# Patient Record
Sex: FEMALE | Race: BLACK OR AFRICAN AMERICAN | NOT HISPANIC OR LATINO | Employment: PART TIME | URBAN - METROPOLITAN AREA
[De-identification: names, ages, dates, MRNs, and addresses within clinical notes are randomized per-mention and may not be internally consistent; named-entity substitution may affect disease eponyms.]

---

## 2018-11-26 ENCOUNTER — HOME CARE/HOSPICE - HEALTHEAST (OUTPATIENT)
Dept: HOME HEALTH SERVICES | Facility: HOME HEALTH | Age: 23
End: 2018-11-26

## 2018-11-26 ENCOUNTER — ANESTHESIA - HEALTHEAST (OUTPATIENT)
Dept: OBGYN | Facility: HOSPITAL | Age: 23
End: 2018-11-26

## 2018-12-02 ENCOUNTER — COMMUNICATION - HEALTHEAST (OUTPATIENT)
Dept: SCHEDULING | Facility: CLINIC | Age: 23
End: 2018-12-02

## 2018-12-04 ENCOUNTER — COMMUNICATION - HEALTHEAST (OUTPATIENT)
Dept: OBGYN | Facility: HOSPITAL | Age: 23
End: 2018-12-04

## 2019-12-27 ENCOUNTER — RECORDS - HEALTHEAST (OUTPATIENT)
Dept: LAB | Facility: HOSPITAL | Age: 24
End: 2019-12-27

## 2019-12-27 LAB — HBV SURFACE AB SERPL IA-ACNC: NEGATIVE M[IU]/ML

## 2019-12-30 LAB
GAMMA INTERFERON BACKGROUND BLD IA-ACNC: 0.02 IU/ML
M TB IFN-G BLD-IMP: NEGATIVE
MITOGEN IGNF BCKGRD COR BLD-ACNC: -0.01 IU/ML
MITOGEN IGNF BCKGRD COR BLD-ACNC: 0 IU/ML
QTF INTERPRETATION: NORMAL
QTF MITOGEN - NIL: 9.42 IU/ML

## 2021-07-14 PROBLEM — Z34.90 PREGNANT: Status: RESOLVED | Noted: 2018-11-25 | Resolved: 2018-11-26

## 2021-09-24 ENCOUNTER — DOCUMENTATION ONLY (OUTPATIENT)
Dept: TRANSPLANT | Facility: CLINIC | Age: 26
End: 2021-09-24

## 2021-09-24 ENCOUNTER — TELEPHONE (OUTPATIENT)
Dept: TRANSPLANT | Facility: CLINIC | Age: 26
End: 2021-09-24

## 2021-09-24 NOTE — TELEPHONE ENCOUNTER
"Donor Intake Start:21Donor Intake Complete:21  Expiration Date:21  Gender:FemalePreferred Language:English  Full Name:Amber DOWNING TessaMadelaine  Needed:[not answered]  Phone Number:[phone] Phone:  Contact Preference:[not answered]Best Contact Time:Noon - 5pm  Emergency Contact:Fidelia Rosales Contact #:[phone]  Relationship to Contact:Other  :95Age:26  Country:[facility]  Address:[address] [place] [address] 1City:[place]  State:[id number]  Height:5'5\"Weight:210lbs  BMI:34.9  Employment Status:UnemployedHas PTO for donation?[not answered]  Occupation:[not answered]Requires Heavy Lifting?[not answered]  Education Level:High SchoolMarital Status:Single  Exercise Routine:OccasionalHealth Insurance:  Yes  Blood Type:UnknownEthnicity/Race:Black or African American  Donor Type:Standard Voucher Donor  Prefer Remote Donation:[not answered]  Physician:Kathryn grande [place], [place]  Donating for Recipient Transfer  Recipient's Center:[unknown]   Recipient's Name:Moiz Mauro :66  Recipient's Status:Patient not on dialysis but needs a transplant soon.How DD knows Recip:Child Of Patient  DD communicates w/ Recip:Every Day  Indicated possible interest in:  Motivation to donate:  My mother needs a kidney  Living Donor Pre-Screening  Is In [place]  Yes  Will Accept Blood Transfusions?  Yes  Has been Diagnosed with Kidney Disease?  No  Has had a Heart Attack?  No  Has Diabetes?  No  Has had Cancer?  No  Has had Kidney Stones?  No  Has ever been Pregnant?  Yes    - Is Currently Pregnant?  No    - Months Since Pregnancy?24+    - Is Currently Nursing?  No    - Gestational Diabetes?  No    - Hypertension during pregnancy?  Never  Is Planning on Pregnancy?  No  Is Taking Birth Control?  No  Has Used Tobacco  Yes    - Currently uses Tobacco?  Yes    - Will Stop for Surgery?  Yes    - How Many Years:10    - Tobacco use (packs/cans) / Frequency:1 / Daily  Has " HIV?  No  Is Currently Incarcerated?  No  Is Currently Residing in [place]  Yes  History Misc  Has Allergies?  No  Has had Surgeries?  No  Takes Medication?  No  Medical History  History of High BP?  Never  Has History Of CABG (bypass surgery)?  No  History of Blood Clots?  Never  History of Coronary Disease?  Never  Has Stents Implanted?  No  Has History of Chest Pain with Exercise?  No  Has History of Chest Pain at Other Times?  No  Results of Climbing 2 Flights of Stairs?No Problem  Has had Stress Test within Last Year?  No  Has had Stroke?  No  Has had Leg Bypass?  No  History of Lung Disease?  Never  History of COPD?  Never  History of TB?  Never    - Is TB Active?[not answered]  History of Pneumonia?  Never  Has Respiratory Issues?  Yes    - Respiratory Issues:Asthma  Has Gastro Issues?  No  History of Gallstones?  Treated in past  History of Pancreatitis?  Never  History of Liver Disease?  Never  History of Hepatitis B?  Never    - Is Hep B Active?[not answered]  History of Hepatitis C?  Never  History of Bleeding Problem?  Never  History of UTIs?  Yes    - UTI episodes:1    - Last UTI:6 years  History of Kidney Damage?  Never  History of Proteinuria?  Never  History of Hematuria?  Never  History of Neuro Disease?  Never  History of Seizure?  Never  History of Lupus?  Never  History of Paralysis?  Never  History of Arthritis?  Never  History of Neuropathy?  Never  History of Depression?  Never  History of Anxiety?  Never  History of Documented Psychiatric Illness?  Never  History of Fibroid Uterus?  Never  History of Endometriosis?  Never  History of Polycystic Ovaries?  Never  Has had Miscarriages?  No  Has had Abortions?  Yes    - # of Abortions:2  Has had Transfusions?  No  History of Obesity?  No  History of Fabry's Disease?  No  History of Sickle Cell Disease?  No  History of Sickle Cell Trait?  No  History of Sarcoidosis?  No  History of Auto-Immune Disease  No  Has had Physical Exam?  Yes    - how many  years ago:6  Has had Mammogram?  No    - how many years ago:  Has had Pap Smear?  Yes    - how many years ago:2  Has had Colonoscopy?  No  Medical History Comments?[no comments]  Living Donor Family Medical History  Anyone with kidney disease?  No  Anyone with liver disease?  No  Anyone with heart disease?  No  Anyone with coronary artery disease?  No  Anyone with high blood pressure?  Yes    - which family members:Both sides  Anyone with blood disorder?  No  Anyone with cancer?  Yes    - which family members:Dads side  Anyone with kidney cancer?  No  Anyone with diabetes?  Yes    - which family members:Both sides  Is mother alive?  Yes  Mother's age?54  Is father alive?  Yes  Father's age?57  How many siblings?10  How many adult children?0  How many children under 18?2  Social History  Has Used Alcohol?  Yes    - currently uses alcohol:  Yes    - how much:1/Weekly  Has Abused Alcohol?  No  Has Used Drugs?  No  Has had legal issues w/ law enforcement?  No  Traveled over 100 miles from home in last year?  Yes    - Traveled Where?[place] and [place]  Has had suicidal thoughts or attempts in the last five years?  No

## 2021-09-28 ENCOUNTER — TELEPHONE (OUTPATIENT)
Dept: TRANSPLANT | Facility: CLINIC | Age: 26
End: 2021-09-28

## 2021-09-28 NOTE — TELEPHONE ENCOUNTER
I left a message for Amber asking her to reschedule her initial WILFREDO call.  I gave her the main transplant center phone number, [phone]-, option 3.

## 2021-10-14 ENCOUNTER — TELEPHONE (OUTPATIENT)
Dept: TRANSPLANT | Facility: CLINIC | Age: 26
End: 2021-10-14

## 2021-10-14 NOTE — TELEPHONE ENCOUNTER
Patient states we called her mother's number attempting to reach her. Writer fixed patient's demographics and is expecting a call from Alexa when she returns.

## 2021-10-19 ENCOUNTER — TELEPHONE (OUTPATIENT)
Dept: TRANSPLANT | Facility: CLINIC | Age: 26
End: 2021-10-19

## 2021-10-19 NOTE — TELEPHONE ENCOUNTER
Initial Independent Living Donor Advocate contact made with potential donor today.  I introduced myself and my role during the donation process, includin.  WILFREDO ROLE   The federal government requires that all licensed transplant centers provide the living donor with an Independent Living Donor Advocate (WILFREDO).  I do not meet recipients or attend meetings that discuss their care or decision to transplant them. My role is separate to avoid any conflict of interest.  My role is to ensure:  1) your rights are protected;  2) you get all the information you need from the transplant team to make a fully informed decision whether to donate;   3) that living donation is in your best interest.   4) that you have the right to decide NOT to go forward with living donation at any time during this process.  I am available to you throughout the workup, during surgery phase and follow-up at home.   2. WORKUP & PRIVACY     Your identity and workup are not shared with the recipient at any time.     There is a medical donor workup that consists of testing to determine if you are healthy enough to donate.  Workup tests include many blood draws, urine collection/ (kidney function testing), chest x-ray, EKG, CT scan. As you complete each step then you may move on to the next.  Workup can take as little or as long as you need and you can stop the process at any time.     Transplant is a treatment option, not a cure. A kidney from a living kidney donor can last 12-14 years.  Other treatment options are  donation and two types of dialysis.     This is major surgery and your estimated hospital stay is approximately 1-2 nights.  After surgery, there are driving and lifting restrictions - no driving for two weeks and no lifting over ten pounds for 6 - 8 weeks.  Donors are routinely off from work for 4 - 6 weeks after surgery, and potentially longer if they have a physical job.       If you anticipate lost wages due to donation,  donor wage reimbursement options may be available to you and will be reviewed with you during the evaluation process.      The recipient's insurance covers the medical expenses related to the donor evaluation and surgery.  However, it is important for you to carry your own health insurance to address any medical issues that are found and are NOT related to living donation.  3.  QUESTIONS  Have you received a packet from the transplant department?     Questions?    Have you discussed with anyone your potential decision to donate?   No.  Is anyone pressuring or coercing you to donate? No.  Have you discussed any financial arrangements with recipient around donating a kidney? No.  Are you aware that you can confidentially opt out at any time, up to and including day of donation? Yes.  At this time, would you like to proceed with the medical evaluation to see if you can be a kidney donor?  Yes.    If yes, the donor coordinator will be reaching out to you with next steps.     You can reach me or someone else on the WILFREDO team by calling [phone] Option 3.    WILFREDO NOTES: Highly motivated to continue evaluation process.  Appt with Alexa Wetzel scheduled for 10/25 at 1PM    Duration of call 20 minutes

## 2021-10-25 ENCOUNTER — DOCUMENTATION ONLY (OUTPATIENT)
Dept: TRANSPLANT | Facility: CLINIC | Age: 26
End: 2021-10-25

## 2021-10-25 NOTE — PROGRESS NOTES
Sent an email to Amber informing her that currently we are holding off on donors for her recip.  I instructed her that she will be a backup and we will bring forward if need be.

## 2022-11-14 PROCEDURE — U0003 INFECTIOUS AGENT DETECTION BY NUCLEIC ACID (DNA OR RNA); SEVERE ACUTE RESPIRATORY SYNDROME CORONAVIRUS 2 (SARS-COV-2) (CORONAVIRUS DISEASE [COVID-19]), AMPLIFIED PROBE TECHNIQUE, MAKING USE OF HIGH THROUGHPUT TECHNOLOGIES AS DESCRIBED BY CMS-2020-01-R: HCPCS | Mod: ORL | Performed by: FAMILY MEDICINE

## 2022-11-15 ENCOUNTER — LAB REQUISITION (OUTPATIENT)
Dept: LAB | Facility: CLINIC | Age: 27
End: 2022-11-15
Payer: COMMERCIAL

## 2022-11-15 DIAGNOSIS — Z20.822 CONTACT WITH AND (SUSPECTED) EXPOSURE TO COVID-19: ICD-10-CM

## 2022-11-15 LAB — SARS-COV-2 RNA RESP QL NAA+PROBE: NEGATIVE

## 2022-11-21 ENCOUNTER — LAB REQUISITION (OUTPATIENT)
Dept: LAB | Facility: CLINIC | Age: 27
End: 2022-11-21

## 2022-11-21 DIAGNOSIS — Z11.3 ENCOUNTER FOR SCREENING FOR INFECTIONS WITH A PREDOMINANTLY SEXUAL MODE OF TRANSMISSION: ICD-10-CM

## 2022-11-21 PROCEDURE — 86803 HEPATITIS C AB TEST: CPT | Performed by: OBSTETRICS & GYNECOLOGY

## 2022-11-21 PROCEDURE — 87389 HIV-1 AG W/HIV-1&-2 AB AG IA: CPT | Performed by: OBSTETRICS & GYNECOLOGY

## 2022-11-21 PROCEDURE — 86780 TREPONEMA PALLIDUM: CPT | Performed by: OBSTETRICS & GYNECOLOGY

## 2022-11-21 PROCEDURE — 87340 HEPATITIS B SURFACE AG IA: CPT | Performed by: OBSTETRICS & GYNECOLOGY

## 2022-11-22 LAB
HBV SURFACE AG SERPL QL IA: NONREACTIVE
HCV AB SERPL QL IA: NONREACTIVE
HIV 1+2 AB+HIV1 P24 AG SERPL QL IA: NONREACTIVE
T PALLIDUM AB SER QL: NONREACTIVE

## 2023-03-06 ENCOUNTER — LAB REQUISITION (OUTPATIENT)
Dept: LAB | Facility: CLINIC | Age: 28
End: 2023-03-06

## 2023-03-06 DIAGNOSIS — R39.9 UNSPECIFIED SYMPTOMS AND SIGNS INVOLVING THE GENITOURINARY SYSTEM: ICD-10-CM

## 2023-03-06 PROCEDURE — 87086 URINE CULTURE/COLONY COUNT: CPT | Performed by: NURSE PRACTITIONER

## 2023-03-08 LAB — BACTERIA UR CULT: NORMAL

## 2024-08-27 ENCOUNTER — HOSPITAL ENCOUNTER (EMERGENCY)
Facility: CLINIC | Age: 29
Discharge: LEFT BEFORE DISCHARGE | End: 2024-08-27
Attending: EMERGENCY MEDICINE | Admitting: EMERGENCY MEDICINE
Payer: COMMERCIAL

## 2024-08-27 VITALS
OXYGEN SATURATION: 99 % | SYSTOLIC BLOOD PRESSURE: 147 MMHG | HEART RATE: 64 BPM | TEMPERATURE: 98.2 F | BODY MASS INDEX: 36.65 KG/M2 | HEIGHT: 65 IN | WEIGHT: 220 LBS | DIASTOLIC BLOOD PRESSURE: 70 MMHG | RESPIRATION RATE: 18 BRPM

## 2024-08-27 DIAGNOSIS — M25.552 HIP PAIN, LEFT: ICD-10-CM

## 2024-08-27 LAB
ANION GAP SERPL CALCULATED.3IONS-SCNC: 12 MMOL/L (ref 7–15)
BASOPHILS # BLD AUTO: 0.1 10E3/UL (ref 0–0.2)
BASOPHILS NFR BLD AUTO: 1 %
BUN SERPL-MCNC: 6.3 MG/DL (ref 6–20)
CALCIUM SERPL-MCNC: 9 MG/DL (ref 8.8–10.4)
CHLORIDE SERPL-SCNC: 107 MMOL/L (ref 98–107)
CREAT SERPL-MCNC: 0.82 MG/DL (ref 0.51–0.95)
CRP SERPL-MCNC: 3.76 MG/L
EGFRCR SERPLBLD CKD-EPI 2021: >90 ML/MIN/1.73M2
EOSINOPHIL # BLD AUTO: 0.1 10E3/UL (ref 0–0.7)
EOSINOPHIL NFR BLD AUTO: 1 %
ERYTHROCYTE [DISTWIDTH] IN BLOOD BY AUTOMATED COUNT: 13.5 % (ref 10–15)
ERYTHROCYTE [SEDIMENTATION RATE] IN BLOOD BY WESTERGREN METHOD: 14 MM/HR (ref 0–20)
GLUCOSE SERPL-MCNC: 113 MG/DL (ref 70–99)
HCG SERPL QL: NEGATIVE
HCO3 SERPL-SCNC: 23 MMOL/L (ref 22–29)
HCT VFR BLD AUTO: 37.1 % (ref 35–47)
HGB BLD-MCNC: 12.2 G/DL (ref 11.7–15.7)
HOLD SPECIMEN: NORMAL
IMM GRANULOCYTES # BLD: 0 10E3/UL
IMM GRANULOCYTES NFR BLD: 0 %
LYMPHOCYTES # BLD AUTO: 3 10E3/UL (ref 0.8–5.3)
LYMPHOCYTES NFR BLD AUTO: 33 %
MCH RBC QN AUTO: 26.4 PG (ref 26.5–33)
MCHC RBC AUTO-ENTMCNC: 32.9 G/DL (ref 31.5–36.5)
MCV RBC AUTO: 80 FL (ref 78–100)
MONOCYTES # BLD AUTO: 0.4 10E3/UL (ref 0–1.3)
MONOCYTES NFR BLD AUTO: 5 %
NEUTROPHILS # BLD AUTO: 5.4 10E3/UL (ref 1.6–8.3)
NEUTROPHILS NFR BLD AUTO: 60 %
NRBC # BLD AUTO: 0 10E3/UL
NRBC BLD AUTO-RTO: 0 /100
PLATELET # BLD AUTO: 326 10E3/UL (ref 150–450)
POTASSIUM SERPL-SCNC: 3.9 MMOL/L (ref 3.4–5.3)
RBC # BLD AUTO: 4.62 10E6/UL (ref 3.8–5.2)
SODIUM SERPL-SCNC: 142 MMOL/L (ref 135–145)
WBC # BLD AUTO: 9 10E3/UL (ref 4–11)

## 2024-08-27 PROCEDURE — 36415 COLL VENOUS BLD VENIPUNCTURE: CPT | Performed by: EMERGENCY MEDICINE

## 2024-08-27 PROCEDURE — 250N000013 HC RX MED GY IP 250 OP 250 PS 637: Performed by: EMERGENCY MEDICINE

## 2024-08-27 PROCEDURE — 99283 EMERGENCY DEPT VISIT LOW MDM: CPT

## 2024-08-27 PROCEDURE — 80048 BASIC METABOLIC PNL TOTAL CA: CPT | Performed by: EMERGENCY MEDICINE

## 2024-08-27 PROCEDURE — 85025 COMPLETE CBC W/AUTO DIFF WBC: CPT | Performed by: EMERGENCY MEDICINE

## 2024-08-27 PROCEDURE — 86140 C-REACTIVE PROTEIN: CPT | Performed by: EMERGENCY MEDICINE

## 2024-08-27 PROCEDURE — 84703 CHORIONIC GONADOTROPIN ASSAY: CPT | Performed by: EMERGENCY MEDICINE

## 2024-08-27 PROCEDURE — 85652 RBC SED RATE AUTOMATED: CPT | Performed by: EMERGENCY MEDICINE

## 2024-08-27 RX ORDER — IBUPROFEN 600 MG/1
600 TABLET, FILM COATED ORAL ONCE
Status: COMPLETED | OUTPATIENT
Start: 2024-08-27 | End: 2024-08-27

## 2024-08-27 RX ORDER — ACETAMINOPHEN 325 MG/1
650 TABLET ORAL ONCE
Status: COMPLETED | OUTPATIENT
Start: 2024-08-27 | End: 2024-08-27

## 2024-08-27 RX ADMIN — ACETAMINOPHEN 650 MG: 325 TABLET ORAL at 11:32

## 2024-08-27 RX ADMIN — IBUPROFEN 600 MG: 600 TABLET ORAL at 11:32

## 2024-08-27 ASSESSMENT — ACTIVITIES OF DAILY LIVING (ADL): ADLS_ACUITY_SCORE: 35

## 2024-08-27 ASSESSMENT — COLUMBIA-SUICIDE SEVERITY RATING SCALE - C-SSRS
6. HAVE YOU EVER DONE ANYTHING, STARTED TO DO ANYTHING, OR PREPARED TO DO ANYTHING TO END YOUR LIFE?: NO
2. HAVE YOU ACTUALLY HAD ANY THOUGHTS OF KILLING YOURSELF IN THE PAST MONTH?: NO
1. IN THE PAST MONTH, HAVE YOU WISHED YOU WERE DEAD OR WISHED YOU COULD GO TO SLEEP AND NOT WAKE UP?: NO

## 2024-08-27 NOTE — ED TRIAGE NOTES
Pt presents with non radiating left hip pain x 2 days. Denies any numbnes or tingling. Sent in from ortho clinic due to concern for infection. Has screw in hip from previous surgery.      Triage Assessment (Adult)       Row Name 08/27/24 1053          Triage Assessment    Airway WDL WDL        Respiratory WDL    Respiratory WDL WDL        Skin Circulation/Temperature WDL    Skin Circulation/Temperature WDL WDL        Cardiac WDL    Cardiac WDL WDL        Peripheral/Neurovascular WDL    Peripheral Neurovascular WDL WDL        Cognitive/Neuro/Behavioral WDL    Cognitive/Neuro/Behavioral WDL WDL

## 2024-08-27 NOTE — ED PROVIDER NOTES
EMERGENCY DEPARTMENT ENCOUNTER      NAME: Amber Sharpe  AGE: 28 year old female  YOB: 1995  MRN: [id number]  EVALUATION DATE & TIME: No admission date for patient encounter.    PCP: Nicolás Rodriguez    ED PROVIDER: Janusz Schofield M.D.      Chief Complaint   Patient presents with    Hip Pain         FINAL IMPRESSION:  1. Hip pain, left          ED COURSE & MEDICAL DECISION MAKIN year old female presents to the Emergency Department for evaluation of left hip pain.  Patient has a history of a remote hip surgery more than 15 years ago as an adolescent.  She presents with left hip pain without any trauma today.  Was referred first from  telemedicine and then orthopedics clinic due to concern about atraumatic left hip pain with fevers.  She does have slightly decreased range of motion of the left hip and is ambulatory but with a limp.  She underwent lab evaluations which revealed a normal white blood cell count and inflammatory markers.  After speaking with the orthopedic PA from urgent care, I had ordered a MRI of the hip with and without contrast to evaluate for any signs of effusion  or signs of infection, with plan to potentially involve IR for an aspiration if there was a significant effusion.  I was informed a little while after I met the patient but the patient had decided to leave [place] prior to completion of her workup.  I was not given the opportunity to talk to the patient prior to her departing to review any concerns or questions.  I was unable to reach her at the listed phone number to discuss her plans.      Medical Decision Making  Obtained supplemental history:Supplemental history obtained?: No  Reviewed external records: External records reviewed?: No  Care impacted by chronic illness:Mental Health  Care significantly affected by social determinants of health:Access to Medical Care  Did you consider but not order tests?: Work up considered but not performed and documented  in chart, if applicable  Did you interpret images independently?: Independent interpretation of ECG and images noted in documentation, when applicable.  Consultation discussion with other provider:Did you involve another provider (consultant, , pharmacy, etc.)?: I discussed the care with another health care provider, see documentation for details.  AMA  No MIPS measures identified.          MEDICATIONS GIVEN IN THE EMERGENCY:  Medications   ibuprofen (ADVIL/MOTRIN) tablet 600 mg (600 mg Oral $Given 8/27/24 1132)   acetaminophen (TYLENOL) tablet 650 mg (650 mg Oral $Given 8/27/24 1132)       NEW PRESCRIPTIONS STARTED AT TODAY'S ER VISIT  Discharge Medication List as of 8/27/2024  2:05 PM             =================================================================    HPI    Patient information was obtained from: the patient    Use of : N/A      Amber Sharpe is a 28 year old female with a pertinent history of depression, hip surgery, and osteoarthritis of both hips who presents to this ED via ambulation for evaluation of hip pain.  Patient reports left hip pain which started a couple of days ago.  She had a hip surgery a number of years ago while she was in eighth grade.  She has not had issues since.  She denies any specific trauma.  She reports for the last couple days she has felt hot and cold, potentially feverish.  Has not measured a temperature at home.  She was seen by telemedicine provider that referred her to orthopedic urgent care today.  There they obtained x-rays which showed some hardware in her left hip but referred her here for consideration of further infectious workup.    REVIEW OF SYSTEMS   All systems reviewed and negative except as noted in HPI.    PAST MEDICAL HISTORY:  History reviewed. No pertinent past medical history.    PAST SURGICAL HISTORY:  Past Surgical History:   Procedure Laterality Date    HIP SURGERY             CURRENT MEDICATIONS:    No current  facility-administered medications for this encounter.     Current Outpatient Medications   Medication Sig Dispense Refill    ALBUTEROL IN Inhale  into the lungs.      Fluticasone Propionate, Inhal, (FLOVENT IN) Inhale  into the lungs.      PREDNISONE PO Take  by mouth.           ALLERGIES:  Allergies   Allergen Reactions    Fish Allergy Anaphylaxis     Fish sticks   Lips swelling    Hydrocodone-Acetaminophen Itching, Nausea, Rash and Shortness Of Breath    Banana Swelling     Lips swelling    Cantaloupe Extract Allergy Skin Test Swelling     Lips swelling    Latex Hives    Mangifera Indica Swelling     Lips swelling    Melon Swelling     Lips swelling    Morphine Other (See Comments)     Pt state panic attack    Peanut (Diagnostic) Swelling     Lips swelling   Peanut butter       FAMILY HISTORY:  History reviewed. No pertinent family history.    SOCIAL HISTORY:   Social History     Socioeconomic History    Marital status: Single   Tobacco Use    Smoking status: Every Day     Current packs/day: 0.25     Average packs/day: 0.3 packs/day for 5.0 years (1.3 ttl pk-yrs)     Types: Cigarettes    Smokeless tobacco: Never   Substance and Sexual Activity    Alcohol use: No    Drug use: No    Sexual activity: Yes     Partners: Male     Social Determinants of Health     Financial Resource Strain: Low Risk  (9/2/2022)    Received from [facility]    Financial Resource Strain     Difficulty of Paying Living Expenses: 3   Food Insecurity: No Food Insecurity (9/2/2022)    Received from [facility]    Food Insecurity     Worried About Running Out of Food in the Last Year: 1   Transportation Needs: No Transportation Needs (9/2/2022)    Received from [facility]    Transportation Needs     Lack of Transportation (Medical): 1    [facility]    Social Connections   Housing Stability: Low Risk  " (9/2/2022)    [facility]    Housing Stability     Unable to Pay for Housing in the Last Year: 1       VITALS:  BP (!) 147/70   Pulse 64   Temp 98.2  F (36.8  C) (Temporal)   Resp 18   Ht 1.651 m (5' 5\")   Wt 99.8 kg (220 lb)   SpO2 99%   BMI 36.61 kg/m      PHYSICAL EXAM    Constitutional: Well developed, Well nourished, NAD.  HENT: Normocephalic, Atraumatic. Neck Supple.  Eyes: EOMI, Conjunctiva normal.  Respiratory: Breathing comfortably on room air. Speaks full sentences easily. Lungs clear to ascultation.  Cardiovascular: Normal heart rate, Regular rhythm. No peripheral edema.  Abdomen: Soft  Musculoskeletal: Slightly decreased range of motion with flexion and abduction of the left hip.  Patient is ambulatory but with a limp.  Integument: Warm, Dry.  Neurologic: Alert & awake, Normal motor function, Normal sensory function, No focal deficits noted.   Psychiatric: Cooperative. Affect appropriate.     LAB:  All pertinent labs reviewed and interpreted.  Labs Ordered and Resulted from Time of ED Arrival to Time of ED Departure   BASIC METABOLIC PANEL - Abnormal       Result Value    Sodium 142      Potassium 3.9      Chloride 107      Carbon Dioxide (CO2) 23      Anion Gap 12      Urea Nitrogen 6.3      Creatinine 0.82      GFR Estimate >90      Calcium 9.0      Glucose 113 (*)    CBC WITH PLATELETS AND DIFFERENTIAL - Abnormal    WBC Count 9.0      RBC Count 4.62      Hemoglobin 12.2      Hematocrit 37.1      MCV 80      MCH 26.4 (*)     MCHC 32.9      RDW 13.5      Platelet Count 326      % Neutrophils 60      % Lymphocytes 33      % Monocytes 5      % Eosinophils 1      % Basophils 1      % Immature Granulocytes 0      NRBCs per 100 WBC 0      Absolute Neutrophils 5.4      Absolute Lymphocytes 3.0      Absolute Monocytes 0.4      Absolute Eosinophils 0.1      Absolute Basophils 0.1      Absolute Immature Granulocytes 0.0      Absolute NRBCs 0.0     CRP " INFLAMMATION - Normal    CRP Inflammation 3.76     ERYTHROCYTE SEDIMENTATION RATE AUTO - Normal    Erythrocyte Sedimentation Rate 14     HCG QUALITATIVE PREGNANCY - Normal    hCG Serum Qualitative Negative         RADIOLOGY:  Reviewed all pertinent imaging. Please see official radiology report.  MR Hip Left w/o & w Contrast    (Results Pending)         I, Elizabeth Randolph, am serving as a scribe to document services personally performed by Dr. Janusz Schofield, based on my observation and the provider's statements to me. I, Janusz Schofield MD attest that Elizabeth Randolph is acting in a scribe capacity, has observed my performance of the services and has documented them in accordance with my direction.    Janusz Schofield M.D.  Emergency Medicine  [facility] EMERGENCY ROOM  [address] [id number] [phone]  Dept: [phone]       Janusz Schofield MD  08/27/24 8147

## 2024-12-02 ENCOUNTER — HOSPITAL ENCOUNTER (EMERGENCY)
Facility: CLINIC | Age: 29
Discharge: HOME | End: 2024-12-02
Attending: EMERGENCY MEDICINE | Admitting: EMERGENCY MEDICINE

## 2024-12-02 ENCOUNTER — APPOINTMENT (OUTPATIENT)
Dept: CT IMAGING | Facility: CLINIC | Age: 29
End: 2024-12-02

## 2024-12-02 VITALS
BODY MASS INDEX: 36.49 KG/M2 | DIASTOLIC BLOOD PRESSURE: 63 MMHG | SYSTOLIC BLOOD PRESSURE: 120 MMHG | RESPIRATION RATE: 18 BRPM | HEART RATE: 58 BPM | OXYGEN SATURATION: 100 % | HEIGHT: 65 IN | WEIGHT: 219 LBS | TEMPERATURE: 97.1 F

## 2024-12-02 DIAGNOSIS — R10.9 ABDOMINAL PAIN, UNSPECIFIED ABDOMINAL LOCATION: ICD-10-CM

## 2024-12-02 LAB
ALBUMIN SERPL BCG-MCNC: 4.2 G/DL (ref 3.5–5.2)
ALBUMIN UR-MCNC: 10 MG/DL
ALP SERPL-CCNC: 77 U/L (ref 40–150)
ALT SERPL W P-5'-P-CCNC: 14 U/L (ref 0–50)
ANION GAP SERPL CALCULATED.3IONS-SCNC: 12 MMOL/L (ref 7–15)
APPEARANCE UR: CLEAR
AST SERPL W P-5'-P-CCNC: 18 U/L (ref 0–45)
BASOPHILS # BLD AUTO: 0 10E3/UL (ref 0–0.2)
BASOPHILS NFR BLD AUTO: 0 %
BILIRUB DIRECT SERPL-MCNC: <0.2 MG/DL (ref 0–0.3)
BILIRUB SERPL-MCNC: 0.5 MG/DL
BILIRUB UR QL STRIP: NEGATIVE
BUN SERPL-MCNC: 9.6 MG/DL (ref 6–20)
CALCIUM SERPL-MCNC: 9.5 MG/DL (ref 8.8–10.4)
CHLORIDE SERPL-SCNC: 109 MMOL/L (ref 98–107)
COLOR UR AUTO: ABNORMAL
CREAT SERPL-MCNC: 0.72 MG/DL (ref 0.51–0.95)
EGFRCR SERPLBLD CKD-EPI 2021: >90 ML/MIN/1.73M2
EOSINOPHIL # BLD AUTO: 0.1 10E3/UL (ref 0–0.7)
EOSINOPHIL NFR BLD AUTO: 1 %
ERYTHROCYTE [DISTWIDTH] IN BLOOD BY AUTOMATED COUNT: 13.2 % (ref 10–15)
GLUCOSE SERPL-MCNC: 98 MG/DL (ref 70–99)
GLUCOSE UR STRIP-MCNC: NEGATIVE MG/DL
HCG INTACT+B SERPL-ACNC: <1 MIU/ML
HCO3 SERPL-SCNC: 20 MMOL/L (ref 22–29)
HCT VFR BLD AUTO: 38.7 % (ref 35–47)
HGB BLD-MCNC: 12.6 G/DL (ref 11.7–15.7)
HGB UR QL STRIP: NEGATIVE
IMM GRANULOCYTES # BLD: 0 10E3/UL
IMM GRANULOCYTES NFR BLD: 0 %
KETONES UR STRIP-MCNC: NEGATIVE MG/DL
LEUKOCYTE ESTERASE UR QL STRIP: NEGATIVE
LIPASE SERPL-CCNC: 19 U/L (ref 13–60)
LYMPHOCYTES # BLD AUTO: 3.3 10E3/UL (ref 0.8–5.3)
LYMPHOCYTES NFR BLD AUTO: 26 %
MCH RBC QN AUTO: 26.3 PG (ref 26.5–33)
MCHC RBC AUTO-ENTMCNC: 32.6 G/DL (ref 31.5–36.5)
MCV RBC AUTO: 81 FL (ref 78–100)
MONOCYTES # BLD AUTO: 0.6 10E3/UL (ref 0–1.3)
MONOCYTES NFR BLD AUTO: 4 %
MUCOUS THREADS #/AREA URNS LPF: PRESENT /LPF
NEUTROPHILS # BLD AUTO: 8.5 10E3/UL (ref 1.6–8.3)
NEUTROPHILS NFR BLD AUTO: 68 %
NITRATE UR QL: NEGATIVE
NRBC # BLD AUTO: 0 10E3/UL
NRBC BLD AUTO-RTO: 0 /100
PH UR STRIP: 6.5 [PH] (ref 5–7)
PLATELET # BLD AUTO: 399 10E3/UL (ref 150–450)
POTASSIUM SERPL-SCNC: 4.1 MMOL/L (ref 3.4–5.3)
PROT SERPL-MCNC: 7.1 G/DL (ref 6.4–8.3)
RBC # BLD AUTO: 4.79 10E6/UL (ref 3.8–5.2)
RBC URINE: 3 /HPF
SODIUM SERPL-SCNC: 141 MMOL/L (ref 135–145)
SP GR UR STRIP: 1.03 (ref 1–1.03)
SQUAMOUS EPITHELIAL: 2 /HPF
UROBILINOGEN UR STRIP-MCNC: <2 MG/DL
WBC # BLD AUTO: 12.5 10E3/UL (ref 4–11)
WBC URINE: 1 /HPF

## 2024-12-02 PROCEDURE — 82310 ASSAY OF CALCIUM: CPT

## 2024-12-02 PROCEDURE — 99285 EMERGENCY DEPT VISIT HI MDM: CPT | Mod: 25

## 2024-12-02 PROCEDURE — 85014 HEMATOCRIT: CPT

## 2024-12-02 PROCEDURE — 85048 AUTOMATED LEUKOCYTE COUNT: CPT

## 2024-12-02 PROCEDURE — 250N000013 HC RX MED GY IP 250 OP 250 PS 637

## 2024-12-02 PROCEDURE — 36415 COLL VENOUS BLD VENIPUNCTURE: CPT

## 2024-12-02 PROCEDURE — 85025 COMPLETE CBC W/AUTO DIFF WBC: CPT

## 2024-12-02 PROCEDURE — 250N000011 HC RX IP 250 OP 636

## 2024-12-02 PROCEDURE — 83690 ASSAY OF LIPASE: CPT

## 2024-12-02 PROCEDURE — 74177 CT ABD & PELVIS W/CONTRAST: CPT

## 2024-12-02 PROCEDURE — 82248 BILIRUBIN DIRECT: CPT

## 2024-12-02 PROCEDURE — 84702 CHORIONIC GONADOTROPIN TEST: CPT

## 2024-12-02 PROCEDURE — 80053 COMPREHEN METABOLIC PANEL: CPT

## 2024-12-02 PROCEDURE — 81001 URINALYSIS AUTO W/SCOPE: CPT

## 2024-12-02 RX ORDER — FAMOTIDINE 10 MG
10 TABLET ORAL ONCE
Status: COMPLETED | OUTPATIENT
Start: 2024-12-02 | End: 2024-12-02

## 2024-12-02 RX ORDER — FAMOTIDINE 20 MG/1
20 TABLET, FILM COATED ORAL 2 TIMES DAILY
Qty: 20 TABLET | Refills: 0 | Status: SHIPPED | OUTPATIENT
Start: 2024-12-02 | End: 2024-12-12

## 2024-12-02 RX ORDER — ONDANSETRON 4 MG/1
4 TABLET, ORALLY DISINTEGRATING ORAL EVERY 8 HOURS PRN
Qty: 10 TABLET | Refills: 0 | Status: SHIPPED | OUTPATIENT
Start: 2024-12-02 | End: 2024-12-05

## 2024-12-02 RX ORDER — IOPAMIDOL 755 MG/ML
90 INJECTION, SOLUTION INTRAVASCULAR ONCE
Status: COMPLETED | OUTPATIENT
Start: 2024-12-02 | End: 2024-12-02

## 2024-12-02 RX ORDER — MAGNESIUM HYDROXIDE/ALUMINUM HYDROXICE/SIMETHICONE 120; 1200; 1200 MG/30ML; MG/30ML; MG/30ML
15 SUSPENSION ORAL ONCE
Status: COMPLETED | OUTPATIENT
Start: 2024-12-02 | End: 2024-12-02

## 2024-12-02 RX ADMIN — FAMOTIDINE 10 MG: 10 TABLET ORAL at 16:07

## 2024-12-02 RX ADMIN — IOPAMIDOL 90 ML: 755 INJECTION, SOLUTION INTRAVENOUS at 14:15

## 2024-12-02 RX ADMIN — ALUMINUM HYDROXIDE, MAGNESIUM HYDROXIDE, AND SIMETHICONE 15 ML: 1200; 120; 1200 SUSPENSION ORAL at 16:08

## 2024-12-02 ASSESSMENT — COLUMBIA-SUICIDE SEVERITY RATING SCALE - C-SSRS
1. IN THE PAST MONTH, HAVE YOU WISHED YOU WERE DEAD OR WISHED YOU COULD GO TO SLEEP AND NOT WAKE UP?: NO
6. HAVE YOU EVER DONE ANYTHING, STARTED TO DO ANYTHING, OR PREPARED TO DO ANYTHING TO END YOUR LIFE?: NO
2. HAVE YOU ACTUALLY HAD ANY THOUGHTS OF KILLING YOURSELF IN THE PAST MONTH?: NO

## 2024-12-02 ASSESSMENT — ACTIVITIES OF DAILY LIVING (ADL)
ADLS_ACUITY_SCORE: 41
ADLS_ACUITY_SCORE: 41

## 2024-12-02 NOTE — DISCHARGE INSTRUCTIONS
Rest. Orally hydrate.  Follow-up with primary care doctor discussed ED visit.  Return to the ED if new symptoms develop or symptoms worsen.  You been prescribed Pepcid and Zofran.  Please take your prescribed medications as prescribed.

## 2024-12-02 NOTE — ED PROVIDER NOTES
"EMERGENCY DEPARTMENT ENCOUNTER      NAME: Amber Everett  AGE: 29 year old female  YOB: 1995  MRN: [id number]  EVALUATION DATE & TIME: No admission date for patient encounter.    PCP: Nicolás Rodriguez    ED PROVIDER: Bonnie Gu PA-C      Chief Complaint   Patient presents with    Abdominal Pain     FINAL IMPRESSION:  1. Abdominal pain, unspecified abdominal location          ED COURSE & MEDICAL DECISION MAKING:    Pertinent Labs & Imaging studies reviewed. (See chart for details)  29 year old female presents to the Emergency Department for evaluation of generalized abdominal pain.  Several days ago patient started to have generalized abdominal pain that she reports is \"burning\".  Patient has a history of ulcerative colitis and she ate a lot of \"crappy food\" which normally flares of her ulcerative colitis.  Patient also has nausea and vomiting.  No fevers or chills.  Vital signs reviewed and unremarkable.  Afebrile.  On exam patient is alert and answering questions appropriately.  Abdomen is soft and nontender.  No rash.  Normal rate.  Lungs are clear to auscultation bilaterally    Differential diagnosis includes UC, colitis, enteritis, diverticulitis, appendicitis, small bowel obstruction.  White blood cell count is 12.5.  Hemoglobin is 12.6.  Sodium and potassium is within normal limits.  Anion gap is 12.  Creatinine is 0.72.  Lipase and hepatic is within normal limits.  Patient's not pregnant.  UA does not show any nitrates or leukocytes.  CT of the abdomen shows no convincing abnormality of the abdomen or pelvis.  Note the mild appendix is slightly proximal to incise but no acute inflammation.  No other findings.  Patient received a GI cocktail and Pepcid here in the emergency room.  Discharged home with Zofran and Pepcid.  Patient was educated on her prescribed medications symptoms improved in the ED.  Patient was educated on results and will be discharged home.  Patient agrees with " plan.  All questions answered.      ED COURSE:   12:09 PM I saw the patient.   3:00 PM Staffed with Dr. Mcnally.   4:45 PM patient be discharged home.  Patient agrees with plan.  All questions answered.  Patient will follow-up with her primary care doctor discussed ED visit.  Patient return to the ED if new symptoms develop or symptoms worsen.       At the conclusion of the encounter I discussed the results of all of the tests and the disposition. The questions were answered. The patient or family acknowledged understanding and was agreeable with the care plan.     0 minutes of critical care time       Medical Decision Making  Obtained supplemental history:Supplemental history obtained?: Documented in chart  Reviewed external records: External records reviewed?: Documented in chart  Care impacted by chronic illness:Documented in Chart  Care significantly affected by social determinants of health:N/A  Did you consider but not order tests?: In addition to work-up documented, I considered the following work up:   Did you interpret images independently?: Independent interpretation of ECG and images noted in documentation, when applicable.  Consultation discussion with other provider:Did you involve another provider (consultant, , pharmacy, etc.)?: I discussed the care with another health care provider, see documentation for details.  Discharge. I prescribed additional prescription strength medication(s) as charted. See documentation for any additional details.    Not Applicable      MEDICATIONS GIVEN IN THE EMERGENCY:  Medications   iopamidol (ISOVUE-370) solution 90 mL (90 mLs Intravenous $Given 12/2/24 1415)   alum & mag hydroxide-simethicone (MAALOX) suspension 15 mL (15 mLs Oral $Given 12/2/24 1608)   famotidine (PEPCID) tablet 10 mg (10 mg Oral $Given 12/2/24 1607)       NEW PRESCRIPTIONS STARTED AT TODAY'S ER VISIT  New Prescriptions    FAMOTIDINE (PEPCID) 20 MG TABLET    Take 1 tablet (20 mg) by mouth 2 times daily  "for 10 days.    ONDANSETRON (ZOFRAN ODT) 4 MG ODT TAB    Take 1 tablet (4 mg) by mouth every 8 hours as needed for nausea.          =================================================================    [facility]    Patient information was obtained from: Patient    Use of : N/A       Amber Everett is a 29 year old female with a pertinent history of ulcerative colitis who presents to this ED by private car for evaluation of abdominal pain which occurred a few days ago. She states her \"burning\" pain is similar to her ulcerative colitis flare episodes. She usually is on a gluten free diet for this. However, she ate different foods last week. Patient endorses nausea and vomiting. She denies pregnancy concerns, fevers, chills, or emesis. No other medical concerns are expressed at this time.     Per chart review, patient visited [facility] on 11/18/22, with a concern of abdominal pain. S/p cholecystectomy. Labs unremarkable for significant pathology on review, pregnancy test negative, lipase WNL. CT abdomen showed: Borderline mural thickening of the hepatic flexure and proximal transverse colon, likely exaggerated due to under distention, may reflect focal inflammation such as colitis (image 40, series 3), correlation patients clinical symptomatology is recommended.     REVIEW OF SYSTEMS   As per HPI    PAST MEDICAL HISTORY:  History reviewed. No pertinent past medical history.    PAST SURGICAL HISTORY:  Past Surgical History:   Procedure Laterality Date    HIP SURGERY             CURRENT MEDICATIONS:    ALBUTEROL IN  famotidine (PEPCID) 20 MG tablet  Fluticasone Propionate, Inhal, (FLOVENT IN)  ondansetron (ZOFRAN ODT) 4 MG ODT tab  PREDNISONE PO        ALLERGIES:  Allergies   Allergen Reactions    Fish Allergy Anaphylaxis     Fish sticks   Lips swelling    Hydrocodone-Acetaminophen Itching, Nausea, Rash and Shortness Of Breath    Banana Swelling     Lips swelling    Cantaloupe Extract Allergy Skin Test " "Swelling     Lips swelling    Latex Hives    Mangifera Indica Swelling     Lips swelling    Melon Swelling     Lips swelling    Morphine Other (See Comments)     Pt state panic attack    Peanut (Diagnostic) Swelling     Lips swelling   Peanut butter       FAMILY HISTORY:  History reviewed. No pertinent family history.    SOCIAL HISTORY:   Social History     Socioeconomic History    Marital status: Single   Tobacco Use    Smoking status: Every Day     Current packs/day: 0.25     Average packs/day: 0.3 packs/day for 5.0 years (1.3 ttl pk-yrs)     Types: Cigarettes    Smokeless tobacco: Never   Substance and Sexual Activity    Alcohol use: No    Drug use: No    Sexual activity: Yes     Partners: Male     Social Drivers of Health     Financial Resource Strain: Low Risk  (9/2/2022)    Received from [facility]    Financial Resource Strain     Difficulty of Paying Living Expenses: 3   Food Insecurity: No Food Insecurity (9/2/2022)    Received from [facility]    Food Insecurity     Worried About Running Out of Food in the Last Year: 1   Transportation Needs: No Transportation Needs (9/2/2022)    [email or web address]    Transportation Needs     Lack of Transportation (Medical): 1    [email or web address]    Social Connections   Housing Stability: Low Risk  (9/2/2022)    [email or web address]    Housing Stability     Unable to Pay for Housing in the Last Year: 1       VITALS:  /55   Pulse 57   Temp 97.1  F (36.2  C) (Temporal)   Resp 17   Ht 1.651 m (5' 5\")   Wt 99.3 kg (219 lb)   LMP 11/18/2024   SpO2 100%   BMI 36.44 kg/m      PHYSICAL EXAM    Physical Exam  Vitals and nursing note reviewed.   Constitutional:       Appearance: Normal appearance.   HENT:      Head: Atraumatic.      Right Ear: External ear normal.      Left Ear: " External ear normal.      Nose: Nose normal.      Mouth/Throat:      Mouth: Mucous membranes are moist.   Eyes:      Conjunctiva/sclera: Conjunctivae normal.      Pupils: Pupils are equal, round, and reactive to light.   Cardiovascular:      Rate and Rhythm: Normal rate and regular rhythm.      Pulses: Normal pulses.      Heart sounds: Normal heart sounds. No murmur heard.     No friction rub. No gallop.   Pulmonary:      Effort: Pulmonary effort is normal.      Breath sounds: Normal breath sounds. No wheezing or rales.   Abdominal:      Tenderness: There is no abdominal tenderness. There is no guarding or rebound.   Musculoskeletal:      Cervical back: Normal range of motion.   Skin:     General: Skin is dry.   Neurological:      Mental Status: She is alert. Mental status is at baseline.   Psychiatric:         Mood and Affect: Mood normal.         Thought Content: Thought content normal.          LAB:  All pertinent labs reviewed and interpreted.  Labs Ordered and Resulted from Time of ED Arrival to Time of ED Departure   BASIC METABOLIC PANEL - Abnormal       Result Value    Sodium 141      Potassium 4.1      Chloride 109 (*)     Carbon Dioxide (CO2) 20 (*)     Anion Gap 12      Urea Nitrogen 9.6      Creatinine 0.72      GFR Estimate >90      Calcium 9.5      Glucose 98     ROUTINE UA WITH MICROSCOPIC REFLEX TO CULTURE - Abnormal    Color Urine Light Yellow      Appearance Urine Clear      Glucose Urine Negative      Bilirubin Urine Negative      Ketones Urine Negative      Specific Gravity Urine 1.030      Blood Urine Negative      pH Urine 6.5      Protein Albumin Urine 10 (*)     Urobilinogen Urine <2.0      Nitrite Urine Negative      Leukocyte Esterase Urine Negative      Mucus Urine Present (*)     RBC Urine 3 (*)     WBC Urine 1      Squamous Epithelials Urine 2 (*)    CBC WITH PLATELETS AND DIFFERENTIAL - Abnormal    WBC Count 12.5 (*)     RBC Count 4.79      Hemoglobin 12.6      Hematocrit 38.7      MCV  81      MCH 26.3 (*)     MCHC 32.6      RDW 13.2      Platelet Count 399      % Neutrophils 68      % Lymphocytes 26      % Monocytes 4      % Eosinophils 1      % Basophils 0      % Immature Granulocytes 0      NRBCs per 100 WBC 0      Absolute Neutrophils 8.5 (*)     Absolute Lymphocytes 3.3      Absolute Monocytes 0.6      Absolute Eosinophils 0.1      Absolute Basophils 0.0      Absolute Immature Granulocytes 0.0      Absolute NRBCs 0.0     LIPASE - Normal    Lipase 19     HEPATIC FUNCTION PANEL - Normal    Protein Total 7.1      Albumin 4.2      Bilirubin Total 0.5      Alkaline Phosphatase 77      AST 18      ALT 14      Bilirubin Direct <0.20     HCG QUANTITATIVE PREGNANCY - Normal    hCG Quantitative <1          RADIOLOGY:  Reviewed all pertinent imaging. Please see official radiology report.  CT Abdomen Pelvis w Contrast   Final Result   IMPRESSION:    1.  No convincing abnormality in the abdomen or pelvis to explain the patient's pain.      2.  Note that the mid appendix is slightly prominent in size but no acute inflammation. Recommend close clinical follow-up.      3.  No other findings.                 I, Lenarddiana Jeremías, am serving as a scribe to document services personally performed by Bonnie Gu PA-C, based on my observation and the provider's statements to me. I, Bonnie Gu PA-C, attest that Jb Veronica is acting in a scribe capacity, has observed my performance of the services and has documented them in accordance with my direction.    Bonnie Gu PA-C  [facility] EMERGENCY ROOM  [address] [address] [phone]

## 2024-12-02 NOTE — Clinical Note
Amber Everett was seen and treated in our emergency department on 12/2/2024.  She may return to work on 12/04/2024.       If you have any questions or concerns, please don't hesitate to call.      Edmundo Mcnally, DO

## 2024-12-02 NOTE — ED TRIAGE NOTES
"    pt states hx ulcerative colitis and started having flare up wed pain was coming and going, now pain is much worse. Pt states feels \"like intestines are burning\", mid abdominal area, vomiting, and nausea, diarrhea, denies bloody or dark stools. Feels like usual flare up. Pt here with son who is also being seen.     "

## 2024-12-02 NOTE — ED PROVIDER NOTES
Emergency Department Midlevel Supervisory Note     I personally saw the patient and performed a substantive portion of the visit including all aspects of the medical decision making.    Impression:  1. Abdominal pain, unspecified abdominal location            MDM / ED Course:  29-year-old female with a history of ulcerative colitis presented to the ED for evaluation of intermittent abdominal pain that is located primarily in her mid upper abdomen with associated nausea and vomiting that have been ongoing for the last 4 to 5 days .  Patient was hemodynamic stable upon arrival to the ED.  She did not appear to be in any obvious distress or discomfort at the time of my evaluation.  On exam the patient was noted to have diffuse tenderness palpation noted throughout the upper abdomen and umbilical region.  She did not have evidence of an acute abdomen, however.     The patient's white blood cell count was 12.5.  Remainder the CBC was unremarkable.  CMP, lipase, and UA were all reassuring.  hCG testing was negative.    CT scan of the abdomen shows a prominent appendix.  However, there is no surrounding inflammatory changes noted.    The patient was reevaluated and informed of the lab and imaging results.  She was informed that there was no evidence of a worsening ulcerative colitis exacerbation.  The elevated white blood cell count likely represents demargination from her vomiting episodes.  I also do not suspect that the patient's symptoms are secondary to an early appendicitis since her symptoms have been ongoing for 4 to 5 days.  The patient was informed that her symptoms may related to peptic ulcer disease or GERD.  The patient was given a GI cocktail and oral famotidine.  Patient stated that she did not want to wait to see if the medications would help with her pain since she wanted to return home.      The patient was sent home with Zofran to use as needed for any further episodes of nausea or vomiting.  She was  "also sent home with the famotidine to use as needed for any worsening of her abdominal pain.  The patient was instructed to use the prescribed famotidine if she ultimately gets relief from the oral famotidine that was just given to her here in the ED.  The patient was instructed to follow-up with her primary care provider for reevaluation or to return back to ED sooner for any worsening abdominal pain, vomiting, fevers, or any other new or concerning symptoms.    3:58 PM Bonnie Gu PA-C staffed patient with me. I agree with their assessment and plan of management, and I will see the patient.    4:43 PM I met with the patient to introduce myself, gather additional history, perform my initial exam, and discuss the plan.   PPE: Provider wore gloves, N95 mask, eye protection.    Brief HPI:     Amber Everett is a 29 year old female who presents for evaluation of abdominal pain which occurred a few days ago. She states her \"burning\" pain is similar to her ulcerative colitis flare episodes. She usually is on a gluten free diet for this. However, she ate different foods last week. Patient endorses nausea and vomiting. She denies pregnancy concerns, fevers, chills, or emesis.         I, Samreen Flynn, am serving as a scribe to document services personally performed by Dr. Edmundo Mcnally, based on my observations and the provider's statements to me.   I, Dr. Edmundo Mcnally, attest that Samreen Flynn was acting in a scribe capacity, has observed my performance of the services and has documented them in accordance with my direction.      Brief Physical Exam:  Constitutional:  Alert, in no acute distress  EYES: Conjunctivae clear  HENT:  Atraumatic, normocephalic  Respiratory:  Respirations even, unlabored, in no acute respiratory distress  Cardiovascular:  Regular rate and rhythm, good peripheral perfusion  GI: Soft.  Mild diffuse tenderness palpation noted to the upper abdomen and periumbilical region.  Normal bowel sounds.  " No rigidity, guarding, rebound.  Musculoskeletal:  No edema. No cyanosis. Range of motion major extremities intact.    Integument: Warm, Dry, No erythema, No rash.   Neurologic:  Alert & oriented, no focal deficits noted  Psych: Normal mood and affect         Labs and Imaging:  Results for orders placed or performed during the hospital encounter of 12/02/24   CT Abdomen Pelvis w Contrast    Impression    IMPRESSION:   1.  No convincing abnormality in the abdomen or pelvis to explain the patient's pain.    2.  Note that the mid appendix is slightly prominent in size but no acute inflammation. Recommend close clinical follow-up.    3.  No other findings.   Basic metabolic panel   Result Value Ref Range    Sodium 141 135 - 145 mmol/L    Potassium 4.1 3.4 - 5.3 mmol/L    Chloride 109 (H) 98 - 107 mmol/L    Carbon Dioxide (CO2) 20 (L) 22 - 29 mmol/L    Anion Gap 12 7 - 15 mmol/L    Urea Nitrogen 9.6 6.0 - 20.0 mg/dL    Creatinine 0.72 0.51 - 0.95 mg/dL    GFR Estimate >90 >60 mL/min/1.73m2    Calcium 9.5 8.8 - 10.4 mg/dL    Glucose 98 70 - 99 mg/dL   Result Value Ref Range    Lipase 19 13 - 60 U/L   Hepatic function panel   Result Value Ref Range    Protein Total 7.1 6.4 - 8.3 g/dL    Albumin 4.2 3.5 - 5.2 g/dL    Bilirubin Total 0.5 <=1.2 mg/dL    Alkaline Phosphatase 77 40 - 150 U/L    AST 18 0 - 45 U/L    ALT 14 0 - 50 U/L    Bilirubin Direct <0.20 0.00 - 0.30 mg/dL   UA with Microscopic reflex to Culture    Specimen: Urine, Midstream   Result Value Ref Range    Color Urine Light Yellow Colorless, Straw, Light Yellow, Yellow    Appearance Urine Clear Clear    Glucose Urine Negative Negative mg/dL    Bilirubin Urine Negative Negative    Ketones Urine Negative Negative mg/dL    Specific Gravity Urine 1.030 1.001 - 1.030    Blood Urine Negative Negative    pH Urine 6.5 5.0 - 7.0    Protein Albumin Urine 10 (A) Negative mg/dL    Urobilinogen Urine <2.0 <2.0 mg/dL    Nitrite Urine Negative Negative    Leukocyte Esterase  Urine Negative Negative    Mucus Urine Present (A) None Seen /LPF    RBC Urine 3 (H) <=2 /HPF    WBC Urine 1 <=5 /HPF    Squamous Epithelials Urine 2 (H) <=1 /HPF   HCG quantitative pregnancy   Result Value Ref Range    hCG Quantitative <1 <5 mIU/mL   CBC with platelets and differential   Result Value Ref Range    WBC Count 12.5 (H) 4.0 - 11.0 10e3/uL    RBC Count 4.79 3.80 - 5.20 10e6/uL    Hemoglobin 12.6 11.7 - 15.7 g/dL    Hematocrit 38.7 35.0 - 47.0 %    MCV 81 78 - 100 fL    MCH 26.3 (L) 26.5 - 33.0 pg    MCHC 32.6 31.5 - 36.5 g/dL    RDW 13.2 10.0 - 15.0 %    Platelet Count 399 150 - 450 10e3/uL    % Neutrophils 68 %    % Lymphocytes 26 %    % Monocytes 4 %    % Eosinophils 1 %    % Basophils 0 %    % Immature Granulocytes 0 %    NRBCs per 100 WBC 0 <1 /100    Absolute Neutrophils 8.5 (H) 1.6 - 8.3 10e3/uL    Absolute Lymphocytes 3.3 0.8 - 5.3 10e3/uL    Absolute Monocytes 0.6 0.0 - 1.3 10e3/uL    Absolute Eosinophils 0.1 0.0 - 0.7 10e3/uL    Absolute Basophils 0.0 0.0 - 0.2 10e3/uL    Absolute Immature Granulocytes 0.0 <=0.4 10e3/uL    Absolute NRBCs 0.0 10e3/uL     I have reviewed the relevant laboratory and radiology studies          Dr. Edmundo Mcnally  [facility] EMERGENCY ROOM  [address] [address] [phone]      Edmundo Mcnally,   12/02/24 [id number]

## 2025-01-11 ENCOUNTER — HEALTH MAINTENANCE LETTER (OUTPATIENT)
Age: 30
End: 2025-01-11

## 2025-01-12 ENCOUNTER — HOSPITAL ENCOUNTER (EMERGENCY)
Facility: CLINIC | Age: 30
Discharge: HOME | End: 2025-01-12
Attending: EMERGENCY MEDICINE | Admitting: EMERGENCY MEDICINE

## 2025-01-12 VITALS
HEIGHT: 66 IN | TEMPERATURE: 98.3 F | OXYGEN SATURATION: 100 % | WEIGHT: 220 LBS | HEART RATE: 89 BPM | RESPIRATION RATE: 20 BRPM | SYSTOLIC BLOOD PRESSURE: 120 MMHG | DIASTOLIC BLOOD PRESSURE: 69 MMHG | BODY MASS INDEX: 35.36 KG/M2

## 2025-01-12 DIAGNOSIS — J10.1 INFLUENZA A: ICD-10-CM

## 2025-01-12 LAB
FLUAV RNA SPEC QL NAA+PROBE: POSITIVE
FLUBV RNA RESP QL NAA+PROBE: NEGATIVE
RSV RNA SPEC NAA+PROBE: NEGATIVE
SARS-COV-2 RNA RESP QL NAA+PROBE: NEGATIVE

## 2025-01-12 PROCEDURE — 87637 SARSCOV2&INF A&B&RSV AMP PRB: CPT | Performed by: EMERGENCY MEDICINE

## 2025-01-12 PROCEDURE — 99284 EMERGENCY DEPT VISIT MOD MDM: CPT

## 2025-01-12 RX ORDER — ONDANSETRON 4 MG/1
4-8 TABLET, ORALLY DISINTEGRATING ORAL EVERY 8 HOURS PRN
Qty: 10 TABLET | Refills: 0 | Status: SHIPPED | OUTPATIENT
Start: 2025-01-12 | End: 2025-01-15

## 2025-01-12 RX ORDER — BENZONATATE 100 MG/1
100-200 CAPSULE ORAL 3 TIMES DAILY PRN
Qty: 30 CAPSULE | Refills: 0 | Status: SHIPPED | OUTPATIENT
Start: 2025-01-12

## 2025-01-12 ASSESSMENT — COLUMBIA-SUICIDE SEVERITY RATING SCALE - C-SSRS
2. HAVE YOU ACTUALLY HAD ANY THOUGHTS OF KILLING YOURSELF IN THE PAST MONTH?: NO
6. HAVE YOU EVER DONE ANYTHING, STARTED TO DO ANYTHING, OR PREPARED TO DO ANYTHING TO END YOUR LIFE?: NO
1. IN THE PAST MONTH, HAVE YOU WISHED YOU WERE DEAD OR WISHED YOU COULD GO TO SLEEP AND NOT WAKE UP?: NO

## 2025-01-12 ASSESSMENT — ACTIVITIES OF DAILY LIVING (ADL)
ADLS_ACUITY_SCORE: 41

## 2025-01-13 NOTE — ED TRIAGE NOTES
PT is coming in tonight with N/V/D, cough and fever that has been going on since Friday.     Mult[facility]x last at 1400     Triage Assessment (Adult)       Row Name 01/12/25 2011          Triage Assessment    Airway WDL WDL        Respiratory WDL    Respiratory WDL cough     Cough Type nonproductive        Skin Circulation/Temperature WDL    Skin Circulation/Temperature WDL WDL        Cardiac WDL    Cardiac WDL WDL        Peripheral/Neurovascular WDL    Peripheral Neurovascular WDL WDL        Cognitive/Neuro/Behavioral WDL    Cognitive/Neuro/Behavioral WDL WDL

## 2025-01-13 NOTE — ED NOTES
Patient discharged after reviewing the AVS.  No questions or concerns at this time.  Patient comfortable and agrees with plan.

## 2025-01-13 NOTE — ED PROVIDER NOTES
EMERGENCY DEPARTMENT ENCOUnter      NAME: Amber Everett  AGE: 29 year old female  YOB: 1995  MRN: [id number]  EVALUATION DATE & TIME: 1/12/2025  9:02 PM    PCP: Nicolás Rodriguez    ED PROVIDER: Sharri Lewis MD      Chief Complaint   Patient presents with    Nausea, Vomiting, & Diarrhea         FINAL IMPRESSION:  1. Influenza A          ED COURSE & MEDICAL DECISION MAKING:      In summary, the patient is a 29-year-old female that presents to the emergency department for evaluation of nausea vomiting diarrhea fever and cough thought secondary to influenza and possibly gastroenteritis.  We will treat symptomatically as an outpatient and recommend close outpatient follow-up    10:10 PM I met with the patient, obtained history, performed an initial exam, and discussed options and plan for diagnostics and treatment here in the ED.  10:57 PM We discussed the plan for discharge and the patient is agreeable. Reviewed supportive cares, symptomatic treatment, outpatient follow up, and reasons to return to the Emergency Department. Patient to be discharged by ED RN.       Medical Decision Making  Obtained supplemental history:Supplemental history obtained?: No  Reviewed external records: External records reviewed?: No  Care impacted by chronic illness:Documented in Chart  Did you consider but not order tests?: Work up considered but not performed and documented in chart, if applicable  Did you interpret images independently?: Independent interpretation of ECG and images noted in documentation, when applicable.  Consultation discussion with other provider:Did you involve another provider (consultant, MH, pharmacy, etc.)?: No  Discharge. I prescribed additional prescription strength medication(s) as charted. See documentation for any additional details.    MIPS: Not Applicable        At the conclusion of the encounter I discussed the results of all of the tests and the disposition. The questions  were answered. The patient or family acknowledged understanding and was agreeable with the care plan.         MEDICATIONS GIVEN IN THE EMERGENCY:  Medications - No data to display    NEW PRESCRIPTIONS STARTED AT TODAY'S ER VISIT  New Prescriptions    BENZONATATE (TESSALON) 100 MG CAPSULE    Take 1-2 capsules (100-200 mg) by mouth 3 times daily as needed for cough.    ONDANSETRON (ZOFRAN ODT) 4 MG ODT TAB    Take 1-2 tablets (4-8 mg) by mouth every 8 hours as needed for nausea or vomiting.          =================================================================    HPI        Amber Everett is a 29 year old female with a pertinent history of asthma who presents to this ED by car for evaluation of nausea, vomiting, and diarrhea.    3 days ago Patient started sneezing, then she had a cough,chills, and sore throat. Yesterday she started to have vomiting and diarrhea. She smokes cigarettes and drinks alcohol occasionally. Patient denies any medical problems but has some medication allergies.       REVIEW OF SYSTEMS     Constitutional:  Denies fever or chills  HENT:  Denies sore throat   Respiratory:  Denies cough or shortness of breath   Cardiovascular:  Denies chest pain or palpitations  GI:  Denies abdominal pain, nausea, or vomiting  Musculoskeletal:  Denies any new extremity pain   Skin:  Denies rash   Neurologic:  Denies headache, focal weakness or sensory changes    All other systems reviewed and are negative      PAST MEDICAL HISTORY:  asthma    PAST SURGICAL HISTORY:  Past Surgical History:   Procedure Laterality Date    HIP SURGERY             CURRENT MEDICATIONS:    benzonatate (TESSALON) 100 MG capsule  ondansetron (ZOFRAN ODT) 4 MG ODT tab  ALBUTEROL IN  Fluticasone Propionate, Inhal, (FLOVENT IN)  PREDNISONE PO        ALLERGIES:  Allergies   Allergen Reactions    Fish Allergy Anaphylaxis     Fish sticks   Lips swelling    Hydrocodone-Acetaminophen Itching, Nausea, Rash and Shortness Of Breath     "Banana Swelling     Lips swelling    Cantaloupe Extract Allergy Skin Test Swelling     Lips swelling    Latex Hives    Mangifera Indica Swelling     Lips swelling    Melon Swelling     Lips swelling    Morphine Other (See Comments)     Pt state panic attack    Peanut (Diagnostic) Swelling     Lips swelling   Peanut butter       FAMILY HISTORY:  No family history on file.    SOCIAL HISTORY:   Social History     Socioeconomic History    Marital status: Single   Tobacco Use    Smoking status: Every Day     Current packs/day: 0.25     Average packs/day: 0.3 packs/day for 5.0 years (1.3 ttl pk-yrs)     Types: Cigarettes    Smokeless tobacco: Never   Substance and Sexual Activity    Alcohol use: No    Drug use: No    Sexual activity: Yes     Partners: Male     Social Drivers of Health     Financial Resource Strain: Low Risk  (9/2/2022)    Received from [facility]    Financial Resource Strain     Difficulty of Paying Living Expenses: 3   Food Insecurity: No Food Insecurity (9/2/2022)    Received from [facility]    Food Insecurity     Worried About Running Out of Food in the Last Year: 1   Transportation Needs: No Transportation Needs (9/2/2022)    Received from [facility]    Transportation Needs     Lack of Transportation (Medical): 1    [facility]    Social Connections   Housing Stability: Low Risk  (9/2/2022)    Received from [facility]    Housing Stability     Unable to Pay for Housing in the Last Year: 1       VITALS:  Patient Vitals for the past 24 hrs:   BP Temp Temp src Pulse Resp SpO2 Height Weight   01/12/25 2257 120/69 -- -- 89 20 100 % -- --   01/12/25 2010 121/81 98.3  F (36.8  C) Oral 92 18 100 % 1.676 m (5' 6\") 99.8 kg (220 lb)       PHYSICAL EXAM    Constitutional:  Well developed, Well nourished,  HENT:  Normocephalic, " Atraumatic, Bilateral external ears normal, Oropharynx moist, Nose normal.   Neck:  Normal range of motion, No meningismus, No stridor.   Eyes:  EOMI, Conjunctiva normal, No discharge.   Respiratory:  Normal breath sounds, No respiratory distress, No wheezing, No chest tenderness.   Cardiovascular:  Normal heart rate, Normal rhythm, No murmurs  GI:  Soft, No tenderness, No guarding, No CVA tenderness.   Musculoskeletal:  Neurovascularly intact distally, No edema, No tenderness, No cyanosis, Good range of motion in all major joints.  Integument:  Warm, Dry, No erythema, No rash.   Lymphatic:  No lymphadenopathy noted.   Neurologic:  Alert & oriented, Normal motor function,  No focal deficits noted.   Psychiatric:  Affect normal, Judgment normal, Mood normal.      LAB:  All pertinent labs reviewed and interpreted.  Results for orders placed or performed during the hospital encounter of 01/12/25   Influenza A/B, RSV and SARS-CoV2 PCR (COVID-19) Nasopharyngeal    Specimen: Nasopharyngeal; Swab   Result Value Ref Range    Influenza A PCR Positive (A) Negative    Influenza B PCR Negative Negative    RSV PCR Negative Negative    SARS CoV2 PCR Negative Negative               I, Juanita Rowell, am serving as a scribe to document services personally performed by Dr. Lewis based on my observation and the provider's statements to me. I, Sharri Lewis MD attest that Juanita Rowell is acting in a scribe capacity, has observed my performance of the services and has documented them in accordance with my direction.    Sharri Lewis MD  Emergency Medicine  [facility] EMERGENCY ROOM  [address] [address] [phone]  Dept: [phone]       Sharri Lewis MD  01/13/25 0354

## 2025-01-13 NOTE — ED NOTES
Patient being discharged prior to an RN assessment being done.  This Patient was seen and assessed by a provider only.

## 2025-01-13 NOTE — DISCHARGE INSTRUCTIONS
Clear liquid diet if having nausea and vomiting  Tylenol 650 mg every 4 hours as needed for fever or pain  Ibuprofen 600 mg every every 6 hours as needed for fever or pain  Dramamine as needed for nausea or vomiting  Return to the emergency department for worsening problems or concerns